# Patient Record
(demographics unavailable — no encounter records)

---

## 2024-12-27 NOTE — HISTORY OF PRESENT ILLNESS
[FreeTextEntry1] : 42yo Pt w/ T2DM, hx of anemia  no hx of csections  hx of 2 miscarriages   (seen by me inpt after delievery) for post partum DM   today here with daughter cayden at the visit    1. DM  regarding DM hx  pt states prior to Jan 2023 did not followup regularly with medical care in Jan 2023 she was not feeling well with weight loss and vision issues, and went to PCP and was diagnosed with DM she tried metformin and it did not sit well with her she was than placed on jardiance states was placed on insulin at the start of her pregnnancy reports DM in the family a1c 5.5 during pregnancy took 14 units premeals of novolog and 14 units NPH  given hx of her DM and age, i had checked inpt labs with antibodies and SHANTAL, anti il-2 nd zn tranporter 8 negative cpeptide +   now post partum she has preDM a1c 5.6 range today 6.3 dec 2024 she was in [place] and states glucose was high and so would take intermittent metformin there but it would upset her stomach  she states she was told by her nutritonist ? that she can use her leftover insulin when glucose is high so she take 5 units intermittently when glucose is high she does not check how she does post insulin  we had a long discussion how this is dangerous can lead to lows and not right treatment for DM or preDM    not on medication for DM at this time  she did not want metformin at the last visit  she wanted to focus in diet and lifestyle   she states her fasting is high 150-160s   diet is overall healthy  HCM OPTHO- had gone during pregnancy, no issues with eyes, no dm in eyes  no plans for further pregnancies   FH mom sister- all dm     SH no smoking, no drinking no drugs

## 2024-12-27 NOTE — PHYSICAL EXAM
[Alert] : alert [Healthy Appearance] : healthy appearance [No Acute Distress] : no acute distress [EOMI] : extra ocular movement intact [No Respiratory Distress] : no respiratory distress [No Accessory Muscle Use] : no accessory muscle use [Normal Rate and Effort] : normal respiratory rate and effort [Clear to Auscultation] : lungs were clear to auscultation bilaterally [Normal S1, S2] : normal S1 and S2 [Normal Rate] : heart rate was normal [Regular Rhythm] : with a regular rhythm [Normal Gait] : normal gait [Oriented x3] : oriented to person, place, and time [Normal Affect] : the affect was normal [Normal Insight/Judgement] : insight and judgment were intact [Normal Mood] : the mood was normal

## 2024-12-27 NOTE — ASSESSMENT
[Long Term Vascular Complications] : long term vascular complications of diabetes [Carbohydrate Consistent Diet] : carbohydrate consistent diet [Importance of Diet and Exercise] : importance of diet and exercise to improve glycemic control, achieve weight loss and improve cardiovascular health [Exercise/Effect on Glucose] : exercise/effect on glucose [Retinopathy Screening] : Patient was referred to ophthalmology for retinopathy screening [FreeTextEntry1] : 40yo Pt w/ T2DM, hx of anemia  no hx of csections  hx of 2 miscarriages  (seen by me inpt after delievery) for post partum DM    pt was seen by me inpt post partum  she has a hx of DM prior to pregnancy  used to be on metformin which she states led to GI SE with abdominal pain she also states was on jardiance but did not tolerate well antibodies negative in 2024 inpt (check HIE) given age and hx   she than was on insulin during her pregnancy  post partum, she was on no medication  she checks FSBG intermittently  plan; a1c 6.3 - preDM  long d/w pt to not to take adhoc random doses of isnulin as it can be dangerous givenhalf life is 4hrs  insulin is not to be used intermittently like the way she is using it rather focus on healthy diet and eating would still reccomend metformin 500mg ER with dinner to help with fasting glucose  long d/w pt  to trial metformin if labs concerning for DM  d/w pt metformin is well tolerated if ER used and taken with food, can start slow and titrate up  diet and lifestyle choices with healthy food and exercise d/w pt   pt is not obese but has RK for DM d/w pt to be cautios of high carb meals, eat protein and vege first and than the carbs     HCM pt was UTD with optho during pregnancy, reminded pt to see otho for annual exam  see optho for predM   HTN goal BP in DM<130/80 check urine mc/cr ratio    HLD  check Lipid panel   labs today if olay start metformin 500mg ER with dinner   --------------  DM education that was d/w pt  Patient was also made aware of the physiological implications of poor glycemic control (micro and macrovascular complications including but not related to stroke, CAD, retinopathy, renal disease) -Risk of untreated diabetes including vision loss, stroke, heart attack and loss of limbs has been discussed with the pt in detail    According to the American Heart Association, people with diabetes are two to four times more likely than people without diabetes to deal with complications from heart disease or have a life-threatening stroke, and complications including death.  For people who do not effectively manage their condition, the chances of developing health problems ranging from cardiovascular problems to nerve damage and renal disease increase tremendously.  That is why people with diabetes need to maintain good blood sugar levels and control their Diabetes This may be achieved by consuming food with a low glycemic index, exercise, and medication. Foods having a low glycemic index (GI) aid in weight loss and increase satiety. People who have diabetes or are at risk of developing it should consume foods with a low GI.   -targets for weight, A1c and FS have been discussed with pt    the importance of checking blood glucose and goals fasting and premeals discussed and how this information can be used to help direct insulin and meal choices diet education; we discussed carb counting, label reading, meal planning, pre-prandial and post-prandial finger stick goals especially in relation to food (for example to check pre and post meal FSBG after the largest meal to better monitor and change dietary choices)  -FS goals have been reviewed with the pt in detail: AM <130, premeals< 140, and  post meal, 160-180  Exercise and healthy eating has been discussed with the pt and its importance in the management of diabetes -we had a lengthy discussion regarding healthy diet (consistent carbohydrates and low calorie content) with the patient.  we also emphasized to maintain routine exercise activity (30 minutes daily or 150 minutes in the week) as tolerated. -we also discussed carbs last- eat vegetables and protein prior to carbs which will help control post prandial glucose rise and can even lower insulin levels.        pt aware i have limited clinics due to hospital coverage see me in 6 months, NP in 3 months  update me with interim health issues or updates and followup with pcp in the interim